# Patient Record
Sex: MALE | Race: BLACK OR AFRICAN AMERICAN | NOT HISPANIC OR LATINO | Employment: UNEMPLOYED | URBAN - METROPOLITAN AREA
[De-identification: names, ages, dates, MRNs, and addresses within clinical notes are randomized per-mention and may not be internally consistent; named-entity substitution may affect disease eponyms.]

---

## 2023-04-02 ENCOUNTER — HOSPITAL ENCOUNTER (EMERGENCY)
Facility: HOSPITAL | Age: 43
Discharge: HOME | End: 2023-04-02
Attending: EMERGENCY MEDICINE
Payer: MEDICAID

## 2023-04-02 VITALS
WEIGHT: 149.94 LBS | TEMPERATURE: 99 F | SYSTOLIC BLOOD PRESSURE: 114 MMHG | OXYGEN SATURATION: 99 % | DIASTOLIC BLOOD PRESSURE: 61 MMHG | RESPIRATION RATE: 17 BRPM | HEART RATE: 88 BPM

## 2023-04-02 DIAGNOSIS — N28.9 RENAL INSUFFICIENCY: ICD-10-CM

## 2023-04-02 DIAGNOSIS — R11.2 NAUSEA AND VOMITING, UNSPECIFIED VOMITING TYPE: ICD-10-CM

## 2023-04-02 DIAGNOSIS — E86.0 DEHYDRATION: ICD-10-CM

## 2023-04-02 DIAGNOSIS — Z72.0 TOBACCO ABUSE: Primary | ICD-10-CM

## 2023-04-02 DIAGNOSIS — R10.84 GENERALIZED ABDOMINAL PAIN: ICD-10-CM

## 2023-04-02 LAB
ALBUMIN SERPL BCP-MCNC: 3.9 G/DL (ref 3.5–5.2)
ALP SERPL-CCNC: 85 U/L (ref 55–135)
ALT SERPL W/O P-5'-P-CCNC: 41 U/L (ref 10–44)
ANION GAP SERPL CALC-SCNC: 10 MMOL/L (ref 8–16)
AST SERPL-CCNC: 104 U/L (ref 10–40)
BACTERIA #/AREA URNS AUTO: NORMAL /HPF
BASOPHILS # BLD AUTO: 0.02 K/UL (ref 0–0.2)
BASOPHILS NFR BLD: 0.4 % (ref 0–1.9)
BILIRUB SERPL-MCNC: 0.7 MG/DL (ref 0.1–1)
BILIRUB UR QL STRIP: NEGATIVE
BUN SERPL-MCNC: 34 MG/DL (ref 6–20)
CALCIUM SERPL-MCNC: 8.7 MG/DL (ref 8.7–10.5)
CHLORIDE SERPL-SCNC: 100 MMOL/L (ref 95–110)
CLARITY UR REFRACT.AUTO: CLEAR
CO2 SERPL-SCNC: 27 MMOL/L (ref 23–29)
COLOR UR AUTO: YELLOW
CREAT SERPL-MCNC: 1.6 MG/DL (ref 0.5–1.4)
DIFFERENTIAL METHOD: ABNORMAL
EOSINOPHIL # BLD AUTO: 0.1 K/UL (ref 0–0.5)
EOSINOPHIL NFR BLD: 1.4 % (ref 0–8)
ERYTHROCYTE [DISTWIDTH] IN BLOOD BY AUTOMATED COUNT: 12.3 % (ref 11.5–14.5)
EST. GFR  (NO RACE VARIABLE): 55.2 ML/MIN/1.73 M^2
GLUCOSE SERPL-MCNC: 90 MG/DL (ref 70–110)
GLUCOSE UR QL STRIP: NEGATIVE
HCT VFR BLD AUTO: 46.4 % (ref 40–54)
HGB BLD-MCNC: 16.5 G/DL (ref 14–18)
HGB UR QL STRIP: NEGATIVE
HYALINE CASTS UR QL AUTO: 1 /LPF
IMM GRANULOCYTES # BLD AUTO: 0.01 K/UL (ref 0–0.04)
IMM GRANULOCYTES NFR BLD AUTO: 0.2 % (ref 0–0.5)
KETONES UR QL STRIP: NEGATIVE
LEUKOCYTE ESTERASE UR QL STRIP: NEGATIVE
LIPASE SERPL-CCNC: 41 U/L (ref 4–60)
LYMPHOCYTES # BLD AUTO: 1.9 K/UL (ref 1–4.8)
LYMPHOCYTES NFR BLD: 36.5 % (ref 18–48)
MCH RBC QN AUTO: 31.4 PG (ref 27–31)
MCHC RBC AUTO-ENTMCNC: 35.6 G/DL (ref 32–36)
MCV RBC AUTO: 88 FL (ref 82–98)
MICROSCOPIC COMMENT: NORMAL
MONOCYTES # BLD AUTO: 0.9 K/UL (ref 0.3–1)
MONOCYTES NFR BLD: 16.8 % (ref 4–15)
NEUTROPHILS # BLD AUTO: 2.3 K/UL (ref 1.8–7.7)
NEUTROPHILS NFR BLD: 44.7 % (ref 38–73)
NITRITE UR QL STRIP: NEGATIVE
NRBC BLD-RTO: 0 /100 WBC
PH UR STRIP: 6 [PH] (ref 5–8)
PLATELET # BLD AUTO: 219 K/UL (ref 150–450)
PMV BLD AUTO: 10.4 FL (ref 9.2–12.9)
POTASSIUM SERPL-SCNC: 4.2 MMOL/L (ref 3.5–5.1)
PROT SERPL-MCNC: 7.7 G/DL (ref 6–8.4)
PROT UR QL STRIP: ABNORMAL
RBC # BLD AUTO: 5.25 M/UL (ref 4.6–6.2)
RBC #/AREA URNS AUTO: 0 /HPF (ref 0–4)
SODIUM SERPL-SCNC: 137 MMOL/L (ref 136–145)
SP GR UR STRIP: 1.02 (ref 1–1.03)
URN SPEC COLLECT METH UR: ABNORMAL
UROBILINOGEN UR STRIP-ACNC: NEGATIVE EU/DL
WBC # BLD AUTO: 5.12 K/UL (ref 3.9–12.7)
WBC #/AREA URNS AUTO: 0 /HPF (ref 0–5)

## 2023-04-02 PROCEDURE — 80053 COMPREHEN METABOLIC PANEL: CPT | Mod: ER | Performed by: EMERGENCY MEDICINE

## 2023-04-02 PROCEDURE — 99284 EMERGENCY DEPT VISIT MOD MDM: CPT | Mod: 25,ER

## 2023-04-02 PROCEDURE — 85025 COMPLETE CBC W/AUTO DIFF WBC: CPT | Mod: ER | Performed by: EMERGENCY MEDICINE

## 2023-04-02 PROCEDURE — 81000 URINALYSIS NONAUTO W/SCOPE: CPT | Mod: ER | Performed by: EMERGENCY MEDICINE

## 2023-04-02 PROCEDURE — 96360 HYDRATION IV INFUSION INIT: CPT | Mod: ER

## 2023-04-02 PROCEDURE — 83690 ASSAY OF LIPASE: CPT | Mod: ER | Performed by: EMERGENCY MEDICINE

## 2023-04-02 PROCEDURE — 25000003 PHARM REV CODE 250: Mod: ER | Performed by: EMERGENCY MEDICINE

## 2023-04-02 RX ORDER — ONDANSETRON 4 MG/1
4 TABLET, ORALLY DISINTEGRATING ORAL EVERY 6 HOURS PRN
Qty: 12 TABLET | Refills: 0 | Status: SHIPPED | OUTPATIENT
Start: 2023-04-02 | End: 2023-04-14

## 2023-04-02 RX ORDER — DICYCLOMINE HYDROCHLORIDE 20 MG/1
20 TABLET ORAL
Status: COMPLETED | OUTPATIENT
Start: 2023-04-02 | End: 2023-04-02

## 2023-04-02 RX ORDER — MAG HYDROX/ALUMINUM HYD/SIMETH 200-200-20
30 SUSPENSION, ORAL (FINAL DOSE FORM) ORAL ONCE
Status: COMPLETED | OUTPATIENT
Start: 2023-04-02 | End: 2023-04-02

## 2023-04-02 RX ORDER — PANTOPRAZOLE SODIUM 40 MG/1
40 TABLET, DELAYED RELEASE ORAL DAILY
Qty: 30 TABLET | Refills: 1 | Status: SHIPPED | OUTPATIENT
Start: 2023-04-02 | End: 2023-05-02

## 2023-04-02 RX ORDER — LIDOCAINE HYDROCHLORIDE 20 MG/ML
15 SOLUTION OROPHARYNGEAL ONCE
Status: COMPLETED | OUTPATIENT
Start: 2023-04-02 | End: 2023-04-02

## 2023-04-02 RX ORDER — DICYCLOMINE HYDROCHLORIDE 20 MG/1
20 TABLET ORAL 4 TIMES DAILY PRN
Qty: 28 TABLET | Refills: 0 | Status: SHIPPED | OUTPATIENT
Start: 2023-04-02 | End: 2023-04-30

## 2023-04-02 RX ADMIN — ALUMINUM HYDROXIDE, MAGNESIUM HYDROXIDE, AND DIMETHICONE 30 ML: 200; 20; 200 SUSPENSION ORAL at 09:04

## 2023-04-02 RX ADMIN — SODIUM CHLORIDE 1000 ML: 9 INJECTION, SOLUTION INTRAVENOUS at 08:04

## 2023-04-02 RX ADMIN — DICYCLOMINE HYDROCHLORIDE 20 MG: 20 TABLET ORAL at 08:04

## 2023-04-02 RX ADMIN — LIDOCAINE HYDROCHLORIDE 15 ML: 20 SOLUTION ORAL; TOPICAL at 09:04

## 2023-04-02 NOTE — ED PROVIDER NOTES
Emergency Medicine Provider Note - 4/2/2023        History     Chief Complaint   Patient presents with    Abdominal Cramping     Reports onset of abd cramps d/t IBS last night          History of Present Illness   HPI    4/2/2023, 7:54 AM  The history is provided by the patient and Spouse, Marybeth Finch is a 43 y.o. male presenting to the ED for abdominal pain.  Patient reports that the pain started last night at about 5:00 p.m..  The pain is described as cramping.  Similar in presentation to his irritable bowel syndrome.  Is better when he is moving around.  Worse when he is remaining still.  Patient denies known history of inflammatory bowel disease  He believes he was feverish-did not measure his temperature.  It was described as generalized cramping.  Patient reports that he was unable to keep anything down.  Patient reports multiple bouts emesis.  The emesis were not bloody.  Since then, his nausea has improved he has been able to keep down electrolyte drink.  Patient reports he is got a history of irritable bowel syndrome.  Patient reports normal bowel movement.  Denies any hematochezia, melena, or mucus.  Patient denies chest pain, chest pressure, shortness of breath, urinary complaints.  Patient usually takes Bentyl, or dicyclomine.  Patient states that he drank coffee prior to arrival.      Arrival mode:  Personal Vehicle      PCP: No primary care provider on file.     Allergies:  Review of patient's allergies indicates:   Allergen Reactions    Iodine     Shellfish containing products        Past Medical History:  Past Medical History:   Diagnosis Date    Irritable bowel syndrome without diarrhea        Past Surgical History:  History reviewed. No pertinent surgical history.      Family History:  History reviewed. No pertinent family history.    Social History:  Social History     Tobacco Use    Smoking status: Every Day     Types: Cigarettes    Smokeless tobacco: Not on file    Substance and Sexual Activity    Alcohol use: Never    Drug use: Never    Sexual activity: Not on file       Triage note, Allergies, Past Medical History, Past Surgical History, Family History and Social History reviewed as documented above.     Review of Systems   Review of Systems   Constitutional:  Positive for fever (Subjective).   HENT:  Negative for sore throat.    Respiratory:  Negative for shortness of breath.    Cardiovascular:  Negative for chest pain.   Gastrointestinal:  Positive for abdominal pain (Cramping abdominal pain, better when he is active and moving around.), blood in stool, nausea and vomiting. Negative for constipation and diarrhea.   Genitourinary:  Negative for dysuria.   Musculoskeletal:  Negative for back pain.   Skin:  Negative for rash.   Neurological:  Negative for weakness.   Hematological:  Does not bruise/bleed easily.        Physical Exam     Initial Vitals [04/02/23 0757]   BP Pulse Resp Temp SpO2   122/77 (!) 114 16 98.6 °F (37 °C) 100 %      MAP       --          Physical Exam    Nursing Notes and Vital Signs Reviewed.  Constitutional: Patient is in . Well-developed and well-nourished.  Head: Atraumatic. Normocephalic.  Eyes: PERRL. EOM intact. Conjunctivae are not pale. No scleral icterus.  ENT: Mucous membranes are moist. Oropharynx is clear and symmetric.    Neck: Supple. Full ROM. No lymphadenopathy.  Cardiovascular:  Tachycardic. Regular rate. Regular rhythm. No murmurs, rubs, or gallops. Distal pulses are 2+ and symmetric.  Pulmonary/Chest: No respiratory distress. Clear to auscultation bilaterally. No wheezing or rales.  Abdominal: Soft and non-distended.  There is no tenderness.  No rebound, guarding, or rigidity. Good bowel sounds.  Genitourinary: No CVA tenderness  Musculoskeletal: Moves all extremities. No obvious deformities. No edema. No calf tenderness.  Skin: Warm and dry.  Neurological:  Alert, awake, and appropriate.  Normal speech.  No acute focal  neurological deficits are appreciated.  Psychiatric: Normal affect. Good eye contact. Appropriate in content.     ED Course     ED Procedures:  Procedures    ED Vital Signs:  Vitals:    04/02/23 0757 04/02/23 0912   BP: 122/77 111/60   Pulse: (!) 114 89   Resp: 16 19   Temp: 98.6 °F (37 °C)    TempSrc: Oral    SpO2: 100% 99%   Weight: 68 kg (149 lb 14.6 oz)        Abnormal Lab Results:  Labs Reviewed   CBC W/ AUTO DIFFERENTIAL - Abnormal; Notable for the following components:       Result Value    MCH 31.4 (*)     Mono % 16.8 (*)     All other components within normal limits   COMPREHENSIVE METABOLIC PANEL - Abnormal; Notable for the following components:    BUN 34 (*)     Creatinine 1.6 (*)      (*)     eGFR 55.2 (*)     All other components within normal limits   URINALYSIS, REFLEX TO URINE CULTURE - Abnormal; Notable for the following components:    Protein, UA 1+ (*)     All other components within normal limits    Narrative:     Specimen Source->Urine   LIPASE   URINALYSIS MICROSCOPIC    Narrative:     Specimen Source->Urine        All Lab Results:  Results for orders placed or performed during the hospital encounter of 04/02/23   CBC W/ AUTO DIFFERENTIAL   Result Value Ref Range    WBC 5.12 3.90 - 12.70 K/uL    RBC 5.25 4.60 - 6.20 M/uL    Hemoglobin 16.5 14.0 - 18.0 g/dL    Hematocrit 46.4 40.0 - 54.0 %    MCV 88 82 - 98 fL    MCH 31.4 (H) 27.0 - 31.0 pg    MCHC 35.6 32.0 - 36.0 g/dL    RDW 12.3 11.5 - 14.5 %    Platelets 219 150 - 450 K/uL    MPV 10.4 9.2 - 12.9 fL    Immature Granulocytes 0.2 0.0 - 0.5 %    Gran # (ANC) 2.3 1.8 - 7.7 K/uL    Immature Grans (Abs) 0.01 0.00 - 0.04 K/uL    Lymph # 1.9 1.0 - 4.8 K/uL    Mono # 0.9 0.3 - 1.0 K/uL    Eos # 0.1 0.0 - 0.5 K/uL    Baso # 0.02 0.00 - 0.20 K/uL    nRBC 0 0 /100 WBC    Gran % 44.7 38.0 - 73.0 %    Lymph % 36.5 18.0 - 48.0 %    Mono % 16.8 (H) 4.0 - 15.0 %    Eosinophil % 1.4 0.0 - 8.0 %    Basophil % 0.4 0.0 - 1.9 %    Differential Method  Automated    Comp. Metabolic Panel   Result Value Ref Range    Sodium 137 136 - 145 mmol/L    Potassium 4.2 3.5 - 5.1 mmol/L    Chloride 100 95 - 110 mmol/L    CO2 27 23 - 29 mmol/L    Glucose 90 70 - 110 mg/dL    BUN 34 (H) 6 - 20 mg/dL    Creatinine 1.6 (H) 0.5 - 1.4 mg/dL    Calcium 8.7 8.7 - 10.5 mg/dL    Total Protein 7.7 6.0 - 8.4 g/dL    Albumin 3.9 3.5 - 5.2 g/dL    Total Bilirubin 0.7 0.1 - 1.0 mg/dL    Alkaline Phosphatase 85 55 - 135 U/L     (H) 10 - 40 U/L    ALT 41 10 - 44 U/L    Anion Gap 10 8 - 16 mmol/L    eGFR 55.2 (A) >60 mL/min/1.73 m^2   Lipase   Result Value Ref Range    Lipase 41 4 - 60 U/L   Urinalysis, Reflex to Urine Culture Urine, Clean Catch    Specimen: Urine   Result Value Ref Range    Specimen UA Urine, Clean Catch     Color, UA Yellow Yellow, Straw, Alicia    Appearance, UA Clear Clear    pH, UA 6.0 5.0 - 8.0    Specific Gravity, UA 1.020 1.005 - 1.030    Protein, UA 1+ (A) Negative    Glucose, UA Negative Negative    Ketones, UA Negative Negative    Bilirubin (UA) Negative Negative    Occult Blood UA Negative Negative    Nitrite, UA Negative Negative    Urobilinogen, UA Negative <2.0 EU/dL    Leukocytes, UA Negative Negative   Urinalysis Microscopic   Result Value Ref Range    RBC, UA 0 0 - 4 /hpf    WBC, UA 0 0 - 5 /hpf    Bacteria Rare None-Occ /hpf    Hyaline Casts, UA 1 0-1/lpf /lpf    Microscopic Comment SEE COMMENT              Imaging Results:  Imaging Results    None               The Emergency Provider reviewed the vital signs and test results, which are outlined above.     ED Discussion     ED Course as of 04/02/23 1001   Sun Apr 02, 2023   0812 Patient declines flat and erect xray. [LB]   0953 Patient had good relief with GI cocktail.  Repeat abdomen soft, no rebound, no guarding, no masses.  Patient is no longer tachycardic.  Patient denied any chest pain, chest pressure.  Patient counseled on the effects of smoking.  We also discussed dietary restrictions.  All  questions answered. [LB]      ED Course User Index  [LB] Amina Mora, DO            9:59 AM  Reassessment: Dr. Mora reassessed the pt.  The pt is resting comfortably and is NAD.  Pt states their sx have improved. Discussed test results, shared treatment plan, specific conditions for return, and the need for f/u.  Answered their questions at this time.  Pt understands and agrees to the plan.  The pt has remained hemodynamically stable through ED course and is stable for discharge.      I discussed with patient and/or family/caretaker that evaluation in the ED does not suggest any emergent or life threatening medical conditions requiring immediate intervention beyond what was provided in the ED, and I believe patient is safe for discharge.  Regardless, an unremarkable evaluation in the ED does not preclude the development or presence of a serious of life threatening condition. As such, patient was instructed to return immediately for any worsening or change in current symptoms.      ED Medication(s):  Medications   sodium chloride 0.9% bolus 1,000 mL 1,000 mL (0 mLs Intravenous Stopped 4/2/23 0857)   dicyclomine tablet 20 mg (20 mg Oral Given 4/2/23 0823)   aluminum-magnesium hydroxide-simethicone 200-200-20 mg/5 mL suspension 30 mL (30 mLs Oral Given 4/2/23 0901)     And   LIDOcaine HCl 2% oral solution 15 mL (15 mLs Oral Given 4/2/23 0901)         Medical Decision Making   Medical Decision Making  Patient with history of irritable bowel.  Presenting with multiple bouts of emesis.  Most recently had been able to tolerate p.o..  Abdominal pain is better with movement, worse with rest.  No history of marijuana use.  Patient given Bentyl p.o..  Patient given GI cocktail as well.  Repeat abdominal exam demonstrates soft, no rebound, no guarding, no masses.  Patient was given a L of normal saline with improvement of heart rate.  Patient was counseled on tobacco abuse.  Referral was made for outpatient tobacco  "cessation.  Patient provided prescriptions for Protonix, Bentyl, and Zofran.  Discussed indications to return emergency department.    Amount and/or Complexity of Data Reviewed  Labs: ordered. Decision-making details documented in ED Course.  Radiology:  Decision-making details documented in ED Course.     Details: Patient declined flattened wrecked x-rays.    Risk  OTC drugs.        Differential Diagnoses: Based on the available information and the initial assessment, the working DIFFERENTIAL DIAGNOSIS considered during this evaluation included, but not limited to:    Appendicitis, Cholecystitis, Gastroenteritis, and Irritable bowel syndrome                        Notable Abnormal Lab:  Notable abnormal findings from INDEPENDENT REVIEW of ORDERED LABS include: Creatinine    Discussed case with:N/A    Additional MDM:   Smoking Cessation: The patient is a smoker. The patient was counseled on smoking cessation for: 3 minutes. The patient was counseled on tobacco related  health complications. The patient was referred to a tobacco treatment program. Appropriate patient literature was given to the patient concerning tobacco cessation.      MIPS Measures     Smoker? Yes     Hypertension: None      Portions of this note may have been created with voice recognition software. Occasional "wrong-word" or "sound-a-like" substitutions may have occurred due to the inherent limitations of voice recognition software. Please, read the note carefully and recognize, using context, where substitutions have occurred.            Clinical Impression       ICD-10-CM ICD-9-CM   1. Tobacco abuse  Z72.0 305.1   2. Nausea and vomiting, unspecified vomiting type  R11.2 787.01   3. Generalized abdominal pain  R10.84 789.07   4. Renal insufficiency  N28.9 593.9   5. Dehydration  E86.0 276.51         ED Disposition       Disposition: Discharge to home  Patient condition: Good    Medication List     Medication List        START taking these " medications      dicyclomine 20 mg tablet  Commonly known as: BENTYL  Take 1 tablet (20 mg total) by mouth 4 (four) times daily as needed (abdominal pain).     ondansetron 4 MG Tbdl  Commonly known as: ZOFRAN-ODT  Take 1 tablet (4 mg total) by mouth every 6 (six) hours as needed (nausea).     pantoprazole 40 MG tablet  Commonly known as: PROTONIX  Take 1 tablet (40 mg total) by mouth once daily. for 30 doses               Where to Get Your Medications        These medications were sent to [id number] - [facility] [address] [address] [address]      Hours: 24-hours Phone: [phone]   dicyclomine 20 mg tablet  ondansetron 4 MG Tbdl  pantoprazole 40 MG tablet         ED Follow-up   Follow-up Information       [facility].    Contact information:  [address]  [place] [address] [phone]               [facility].    Why: Return to emergency department for: Chest pain, chest pressure, shortness of breath, blood in stool, black tarry stool, fever, worsening abdominal pain, or worsening condition  Contact information:  [address] [facility] [address] [phone]                                      Amina Mora DO  04/02/23 1007

## 2023-04-02 NOTE — Clinical Note
"Stuart Throntonsondra Finch was seen and treated in our emergency department on 4/2/2023.  He may return to work on 04/03/2023.       If you have any questions or concerns, please don't hesitate to call.      Amina Mora, DO"

## 2023-04-02 NOTE — DISCHARGE INSTRUCTIONS
Avoid red sauces, caffeine, peppermint, peppers,  alcohol, motrin/ibuprofen/advil/NSAIDS, and tobacco.      Eat a bland diet.    Return to the ED for:   Blood in stool, black tarry stool, dizziness, light-headedness, worsening pain, passing out, vomiting blood or other concerns.     Take frequent sips of Pedialyte, Powerade, Gatorade.  Popsicles.  [place] diet, bananas, breads, rice.  Avoid red sauces, fruit juices, and dairy products as can irritate your stomach.  If drinking water, be sure to have something salty such as crackers to help restore electrolytes.  Return to emergency department for: Weakness, passing out, blood in stool, blood in vomit, fever, worsening abdominal pain, or other concerns.